# Patient Record
Sex: MALE | Race: WHITE | URBAN - METROPOLITAN AREA
[De-identification: names, ages, dates, MRNs, and addresses within clinical notes are randomized per-mention and may not be internally consistent; named-entity substitution may affect disease eponyms.]

---

## 2023-01-23 ENCOUNTER — OFFICE VISIT (OUTPATIENT)
Dept: URBAN - METROPOLITAN AREA CLINIC 30 | Facility: CLINIC | Age: 68
End: 2023-01-23
Payer: MEDICARE

## 2023-01-23 DIAGNOSIS — H25.13 AGE-RELATED NUCLEAR CATARACT, BILATERAL: ICD-10-CM

## 2023-01-23 DIAGNOSIS — H43.813 VITREOUS DEGENERATION, BILATERAL: ICD-10-CM

## 2023-01-23 DIAGNOSIS — H20.012 PRIMARY IRIDOCYCLITIS, LEFT EYE: Primary | ICD-10-CM

## 2023-01-23 DIAGNOSIS — H53.042 AMBLYOPIA SUSPECT, LEFT EYE: ICD-10-CM

## 2023-01-23 PROCEDURE — 76514 ECHO EXAM OF EYE THICKNESS: CPT

## 2023-01-23 PROCEDURE — 92133 CPTRZD OPH DX IMG PST SGM ON: CPT

## 2023-01-23 PROCEDURE — 99204 OFFICE O/P NEW MOD 45 MIN: CPT

## 2023-01-23 RX ORDER — PREDNISOLONE ACETATE 10 MG/ML
1 % SUSPENSION/ DROPS OPHTHALMIC
Qty: 5 | Refills: 2 | Status: ACTIVE
Start: 2023-01-23

## 2023-01-23 RX ORDER — DORZOLAMIDE HYDROCHLORIDE AND TIMOLOL MALEATE 20; 5 MG/ML; MG/ML
SOLUTION/ DROPS OPHTHALMIC
Qty: 10 | Refills: 2 | Status: ACTIVE
Start: 2023-01-23

## 2023-01-23 ASSESSMENT — INTRAOCULAR PRESSURE
OS: 28
OD: 11

## 2023-01-23 NOTE — IMPRESSION/PLAN
Impression: Age-related nuclear cataract, bilateral: H25.13.  Plan: Evaluate after resolution of uveitis OS

## 2023-01-23 NOTE — IMPRESSION/PLAN
Impression: Vitreous degeneration, bilateral: H43.813. Plan: S/S of RD discussed, pt to RTC ASAP if occurs. Monitor. Repeat Mac OCT next visit.

## 2023-01-23 NOTE — IMPRESSION/PLAN
Impression: Primary iridocyclitis, left eye: H20.012. Plan: First episode, OS only. No known autoimmune diseases. + Hx lymphoma x 6 years ago, s/p stem cell transplant PLAN:
- Pred forte drops to affected eye QID 
- Cosopt BID OS due to elevated IOP
- Laboratory evaluation may be performed and will include: PPD, RPR/FTA-Abs, ESR/CRP, ACE/CXR, toxo titer, RF, HLA-B27, ANCA
- Discussed with patient the natural history of uveitis, and reviewed symptoms such as increasing pain, photophobia, or redness, and change in vision, which should prompt return RTC 1 week
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
## 2023-02-06 ENCOUNTER — OFFICE VISIT (OUTPATIENT)
Dept: URBAN - METROPOLITAN AREA CLINIC 30 | Facility: CLINIC | Age: 68
End: 2023-02-06
Payer: MEDICARE

## 2023-02-06 DIAGNOSIS — H20.012 PRIMARY IRIDOCYCLITIS, LEFT EYE: Primary | ICD-10-CM

## 2023-02-06 PROCEDURE — 99214 OFFICE O/P EST MOD 30 MIN: CPT

## 2023-02-06 ASSESSMENT — INTRAOCULAR PRESSURE
OS: 19
OD: 14

## 2023-02-06 NOTE — IMPRESSION/PLAN
Impression: Primary iridocyclitis, left eye: H20.012. Plan: First episode, OS only. No known autoimmune diseases. + Hx lymphoma x 6 years ago, s/p stem cell transplant Today 2/6/23 much improved on PF QID, Cosopt BID, IOP 19 PLAN:
- Begin weekly PF taper, 3/2/1
- Continue Cosopt BID OS
- Labs drawn at [facility] 1/24/23 RTC 3-4 weeks UPDATE: Labs received 2/6/23, tech to call and inform pt results all WNL 
CBC w/ diff: WNL
ESR: WNL HLA-B27: not obtained CRP: WNL
ACE: WNL
lysozyme: WNL IAN: neg Proteinase-3 antibody: neg Lyme: 2 reactive bands, overall WNL Quant-gold: neg RPR: non-reactive